# Patient Record
Sex: FEMALE | HISPANIC OR LATINO | Employment: FULL TIME | URBAN - METROPOLITAN AREA
[De-identification: names, ages, dates, MRNs, and addresses within clinical notes are randomized per-mention and may not be internally consistent; named-entity substitution may affect disease eponyms.]

---

## 2021-05-24 ENCOUNTER — OFFICE VISIT (OUTPATIENT)
Dept: FAMILY MEDICINE CLINIC | Facility: CLINIC | Age: 51
End: 2021-05-24

## 2021-05-24 VITALS
WEIGHT: 111.2 LBS | HEIGHT: 60 IN | TEMPERATURE: 98.7 F | BODY MASS INDEX: 21.83 KG/M2 | DIASTOLIC BLOOD PRESSURE: 90 MMHG | SYSTOLIC BLOOD PRESSURE: 151 MMHG | HEART RATE: 83 BPM | OXYGEN SATURATION: 99 %

## 2021-05-24 DIAGNOSIS — Z00.00 WELLNESS EXAMINATION: Primary | ICD-10-CM

## 2021-05-24 PROCEDURE — 99386 PREV VISIT NEW AGE 40-64: CPT | Performed by: FAMILY MEDICINE

## 2021-05-24 NOTE — PROGRESS NOTES
"Chief Complaint  Establish Care and Annual Exam    Subjective          Clarita Burton presents to [facility]  History of Present Illness  Mom and dad with HTN, not fasting got COVID vaccine,last Colonoscopy ever  , last pap 2019 and MY also, not fasting, no colonoscopy ever, no chest pain or shortness of breath, no abdominal pain, no dysuria  Objective   Vital Signs:   /90 (BP Location: Right arm, Patient Position: Sitting)   Pulse 83   Temp 98.7 °F (37.1 °C) (Temporal)   Ht 152.4 cm (60\")   Wt 50.4 kg (111 lb 3.2 oz)   SpO2 99%   BMI 21.72 kg/m²     Physical Exam  Vitals and nursing note reviewed.   Constitutional:       Appearance: She is well-developed.   HENT:      Head: Normocephalic and atraumatic.      Right Ear: External ear normal.      Left Ear: External ear normal.      Nose: Nose normal.   Eyes:      General: No scleral icterus.        Right eye: No discharge.         Left eye: No discharge.      Conjunctiva/sclera: Conjunctivae normal.      Pupils: Pupils are equal, round, and reactive to light.   Neck:      Thyroid: No thyromegaly.      Vascular: No JVD.      Trachea: No tracheal deviation.   Cardiovascular:      Rate and Rhythm: Normal rate and regular rhythm.      Heart sounds: Normal heart sounds. No murmur heard.   No friction rub. No gallop.    Pulmonary:      Effort: Pulmonary effort is normal. No respiratory distress.      Breath sounds: Normal breath sounds. No wheezing or rales.   Chest:      Chest wall: No tenderness.   Abdominal:      General: Bowel sounds are normal. There is no distension.      Palpations: Abdomen is soft. There is no mass.      Tenderness: There is no abdominal tenderness. There is no guarding or rebound.      Hernia: No hernia is present.   Musculoskeletal:         General: No tenderness. Normal range of motion.      Cervical back: Normal range of motion and neck supple.   Lymphadenopathy:      Cervical: No cervical adenopathy. "   Skin:     General: Skin is warm and dry.   Neurological:      Cranial Nerves: No cranial nerve deficit.      Sensory: No sensory deficit.      Motor: No abnormal muscle tone.      Coordination: Coordination normal.      Deep Tendon Reflexes: Reflexes normal.   Psychiatric:         Behavior: Behavior normal.         Thought Content: Thought content normal.         Judgment: Judgment normal.        Result Review :                 Assessment and Plan    Diagnoses and all orders for this visit:    1. Wellness examination (Primary)  -     Cologuard - Stool, Per Rectum; Future  -     Mammo Screening Bilateral With CAD; Future  -     CBC & Differential; Future  -     Comprehensive Metabolic Panel; Future  -     Lipid Panel; Future  -     TSH; Future  -     POC Urinalysis Dipstick, Automated; Future  -     Hepatitis C Antibody; Future        Follow Up   Return in about 4 weeks (around 6/21/2021).  Patient was given instructions and counseling regarding her condition or for health maintenance advice. Please see specific information pulled into the AVS if appropriate.     Monitor blood pressure, BP recheck 127/80

## 2021-06-14 DIAGNOSIS — Z00.00 WELLNESS EXAMINATION: ICD-10-CM

## 2021-06-15 LAB
ALBUMIN SERPL-MCNC: 4.6 G/DL (ref 3.5–5.2)
ALBUMIN/GLOB SERPL: 1.5 G/DL
ALP SERPL-CCNC: 59 U/L (ref 39–117)
ALT SERPL-CCNC: 11 U/L (ref 1–33)
AST SERPL-CCNC: 12 U/L (ref 1–32)
BASOPHILS # BLD AUTO: 0.05 10*3/MM3 (ref 0–0.2)
BASOPHILS NFR BLD AUTO: 0.7 % (ref 0–1.5)
BILIRUB SERPL-MCNC: 0.2 MG/DL (ref 0–1.2)
BUN SERPL-MCNC: 22 MG/DL (ref 6–20)
BUN/CREAT SERPL: 33.8 (ref 7–25)
CALCIUM SERPL-MCNC: 9.3 MG/DL (ref 8.6–10.5)
CHLORIDE SERPL-SCNC: 104 MMOL/L (ref 98–107)
CHOLEST SERPL-MCNC: 193 MG/DL (ref 0–200)
CO2 SERPL-SCNC: 25.1 MMOL/L (ref 22–29)
CREAT SERPL-MCNC: 0.65 MG/DL (ref 0.57–1)
EOSINOPHIL # BLD AUTO: 0.25 10*3/MM3 (ref 0–0.4)
EOSINOPHIL NFR BLD AUTO: 3.4 % (ref 0.3–6.2)
ERYTHROCYTE [DISTWIDTH] IN BLOOD BY AUTOMATED COUNT: 12.1 % (ref 12.3–15.4)
GLOBULIN SER CALC-MCNC: 3 GM/DL
GLUCOSE SERPL-MCNC: 87 MG/DL (ref 65–99)
HCT VFR BLD AUTO: 38.1 % (ref 34–46.6)
HCV AB S/CO SERPL IA: <0.1 S/CO RATIO (ref 0–0.9)
HDLC SERPL-MCNC: 76 MG/DL (ref 40–60)
HGB BLD-MCNC: 12.6 G/DL (ref 12–15.9)
IMM GRANULOCYTES # BLD AUTO: 0.02 10*3/MM3 (ref 0–0.05)
IMM GRANULOCYTES NFR BLD AUTO: 0.3 % (ref 0–0.5)
LDLC SERPL CALC-MCNC: 107 MG/DL (ref 0–100)
LYMPHOCYTES # BLD AUTO: 2.71 10*3/MM3 (ref 0.7–3.1)
LYMPHOCYTES NFR BLD AUTO: 36.9 % (ref 19.6–45.3)
MCH RBC QN AUTO: 32.8 PG (ref 26.6–33)
MCHC RBC AUTO-ENTMCNC: 33.1 G/DL (ref 31.5–35.7)
MCV RBC AUTO: 99.2 FL (ref 79–97)
MONOCYTES # BLD AUTO: 0.69 10*3/MM3 (ref 0.1–0.9)
MONOCYTES NFR BLD AUTO: 9.4 % (ref 5–12)
NEUTROPHILS # BLD AUTO: 3.62 10*3/MM3 (ref 1.7–7)
NEUTROPHILS NFR BLD AUTO: 49.3 % (ref 42.7–76)
NRBC BLD AUTO-RTO: 0 /100 WBC (ref 0–0.2)
PLATELET # BLD AUTO: 204 10*3/MM3 (ref 140–450)
POTASSIUM SERPL-SCNC: 4.1 MMOL/L (ref 3.5–5.2)
PROT SERPL-MCNC: 7.6 G/DL (ref 6–8.5)
RBC # BLD AUTO: 3.84 10*6/MM3 (ref 3.77–5.28)
SODIUM SERPL-SCNC: 139 MMOL/L (ref 136–145)
TRIGL SERPL-MCNC: 55 MG/DL (ref 0–150)
TSH SERPL DL<=0.005 MIU/L-ACNC: 3.11 UIU/ML (ref 0.27–4.2)
VLDLC SERPL CALC-MCNC: 10 MG/DL (ref 5–40)
WBC # BLD AUTO: 7.34 10*3/MM3 (ref 3.4–10.8)

## 2022-03-25 ENCOUNTER — TELEPHONE (OUTPATIENT)
Dept: FAMILY MEDICINE CLINIC | Facility: CLINIC | Age: 52
End: 2022-03-25

## 2022-03-25 DIAGNOSIS — Z01.419 WELL FEMALE EXAM WITH ROUTINE GYNECOLOGICAL EXAM: Primary | ICD-10-CM

## 2022-03-25 NOTE — TELEPHONE ENCOUNTER
Caller: Chace Scott     Relationship:  (EC)     Best call back number: [phone] (H)    What is the best time to reach you: ANYTIME    Who are you requesting to speak with (clinical staff, provider,  specific staff member): DR. OMID MOBLEY    Do you know the name of the person who called:      What was the call regarding:  PATIENT'S  CALLED TO REQUEST A PSYCH EVAL FOR PATIENT.    Do you require a callback: YES         THANKS

## 2022-03-25 NOTE — TELEPHONE ENCOUNTER
Patient  called on behalf of the patient and wants her to have a psychological test done.  Patient can be reached at [phone]

## 2022-04-05 ENCOUNTER — TRANSCRIBE ORDERS (OUTPATIENT)
Dept: ADMINISTRATIVE | Facility: HOSPITAL | Age: 52
End: 2022-04-05

## 2022-04-05 DIAGNOSIS — Z12.31 OTHER SCREENING MAMMOGRAM: Primary | ICD-10-CM

## 2022-04-29 ENCOUNTER — OFFICE VISIT (OUTPATIENT)
Dept: OBSTETRICS AND GYNECOLOGY | Age: 52
End: 2022-04-29

## 2022-04-29 VITALS
WEIGHT: 111.2 LBS | BODY MASS INDEX: 21.83 KG/M2 | DIASTOLIC BLOOD PRESSURE: 88 MMHG | HEIGHT: 60 IN | SYSTOLIC BLOOD PRESSURE: 134 MMHG

## 2022-04-29 DIAGNOSIS — Z01.419 ENCOUNTER FOR GYNECOLOGICAL EXAMINATION WITHOUT ABNORMAL FINDING: ICD-10-CM

## 2022-04-29 DIAGNOSIS — Z12.4 SCREENING FOR MALIGNANT NEOPLASM OF CERVIX: Primary | ICD-10-CM

## 2022-04-29 PROCEDURE — 99386 PREV VISIT NEW AGE 40-64: CPT | Performed by: NURSE PRACTITIONER

## 2022-04-29 NOTE — PROGRESS NOTES
[facility]   Obstetrics and Gynecology       2022    Patient: Tarah Funk          MR#:[id number]    History of Present Illness    Chief Complaint   Patient presents with   • [facility]     New gyn for annual exam. Last pap , results negative per pt, last mg , results negative per pt, no colonoscopy yet.        51 y.o. female  who presents for annual exam. She is sexually active with her . She is not preventing pregnancy. She is feeling well and has no complaints. MXR scheduled for July this year.     Studies reviewed:    Patient's last menstrual period was 2022 (exact date).  Obstetric History:  OB History        1    Para   1    Term   1            AB        Living   1       SAB        IAB        Ectopic        Molar        Multiple        Live Births   1               Menstrual History:     Patient's last menstrual period was 2022 (exact date).       Sexual History:       ________________________________________  Patient Active Problem List   Diagnosis   • Wellness examination     History reviewed. No pertinent past medical history.  Past Surgical History:   Procedure Laterality Date   • BREAST BIOPSY       Social History     Tobacco Use   Smoking Status Never Smoker   Smokeless Tobacco Never Used     History reviewed. No pertinent family history.  Prior to Admission medications    Not on File     ________________________________________    Current contraception: none  History of abnormal Pap smear: no  Family history of uterine or ovarian cancer: no  Family History of colon cancer/colon polyps: no  History of abnormal mammogram: yes - left breast biopsy in  reported benign  History of abnormal lipids: no    The following portions of the patient's history were reviewed and updated as appropriate: allergies, current medications, past family history, past medical history, past social history, past surgical history, and problem  "list.    Review of Systems   Constitutional: Negative for activity change, appetite change, chills, fatigue and fever.   Respiratory: Negative for cough and shortness of breath.    Cardiovascular: Negative for chest pain.   Gastrointestinal: Negative for constipation, diarrhea, nausea and vomiting.   Genitourinary: Negative for dysuria, flank pain, genital sores, hematuria, menstrual problem and vaginal bleeding.            Objective   Physical Exam  Constitutional:       Appearance: Normal appearance.   HENT:      Head: Normocephalic and atraumatic.      Nose: Nose normal.      Mouth/Throat:      Mouth: Mucous membranes are moist.   Pulmonary:      Effort: Pulmonary effort is normal.   Chest:   Breasts:      Right: Normal. No mass or nipple discharge.      Left: Normal. No mass or nipple discharge.       Abdominal:      General: Abdomen is flat.      Palpations: Abdomen is soft.   Genitourinary:     General: Normal vulva.      Exam position: Lithotomy position.      Labia:         Right: No rash, tenderness or lesion.         Left: No rash, tenderness or lesion.       Vagina: Normal. No signs of injury.      Cervix: Normal.      Uterus: Normal.       Adnexa: Right adnexa normal and left adnexa normal.      Rectum: Normal.   Musculoskeletal:         General: Normal range of motion.      Cervical back: Normal range of motion.   Skin:     General: Skin is warm and dry.   Neurological:      Mental Status: She is alert.   Psychiatric:         Mood and Affect: Mood normal.         Behavior: Behavior normal.         /88   Ht 152.4 cm (60\")   Wt 50.4 kg (111 lb 3.2 oz)   LMP 04/08/2022 (Exact Date)   Breastfeeding No   BMI 21.72 kg/m²    BP Readings from Last 3 Encounters:   04/29/22 134/88   05/24/21 151/90      Wt Readings from Last 3 Encounters:   04/29/22 50.4 kg (111 lb 3.2 oz)   05/24/21 50.4 kg (111 lb 3.2 oz)        BMI: Estimated body mass index is 21.72 kg/m² as calculated from the following:    Height " "as of this encounter: 152.4 cm (60\").    Weight as of this encounter: 50.4 kg (111 lb 3.2 oz).    Counseling:  --Nutrition: Stressed importance of moderation and caloric balance, stressed fresh fruit and vegetables  --Exercise: Stressed the importance of regular exercise. 3-5 times weekly   - Discussed screening mammogram recommendations.   --Discussed benefits of screening colonoscopy- age 45 unless FH  --Discussed pap smear screening recommendations             Assessment:  Diagnoses and all orders for this visit:    1. Screening for malignant neoplasm of cervix (Primary)  -     IGP, Apt HPV,rfx 16 / 18,45        Plan:  No follow-ups on file.    Kate Patricia, APRN  4/29/2022 08:57 EDT   "

## 2022-05-04 LAB
CYTOLOGIST CVX/VAG CYTO: NORMAL
CYTOLOGY CVX/VAG DOC CYTO: NORMAL
CYTOLOGY CVX/VAG DOC THIN PREP: NORMAL
DX ICD CODE: NORMAL
HIV 1 & 2 AB SER-IMP: NORMAL
HPV I/H RISK 4 DNA CVX QL PROBE+SIG AMP: NEGATIVE
OTHER STN SPEC: NORMAL
STAT OF ADQ CVX/VAG CYTO-IMP: NORMAL

## 2022-07-11 ENCOUNTER — HOSPITAL ENCOUNTER (OUTPATIENT)
Dept: MAMMOGRAPHY | Facility: HOSPITAL | Age: 52
End: 2022-07-11

## 2023-05-08 ENCOUNTER — OFFICE VISIT (OUTPATIENT)
Dept: OBSTETRICS AND GYNECOLOGY | Age: 53
End: 2023-05-08
Payer: COMMERCIAL

## 2023-05-08 ENCOUNTER — PROCEDURE VISIT (OUTPATIENT)
Dept: OBSTETRICS AND GYNECOLOGY | Age: 53
End: 2023-05-08
Payer: COMMERCIAL

## 2023-05-08 VITALS
HEIGHT: 60 IN | BODY MASS INDEX: 22.19 KG/M2 | SYSTOLIC BLOOD PRESSURE: 112 MMHG | DIASTOLIC BLOOD PRESSURE: 62 MMHG | WEIGHT: 113 LBS

## 2023-05-08 DIAGNOSIS — N95.1 MENOPAUSAL SYMPTOMS: ICD-10-CM

## 2023-05-08 DIAGNOSIS — Z01.419 WELL WOMAN EXAM WITH ROUTINE GYNECOLOGICAL EXAM: Primary | ICD-10-CM

## 2023-05-08 DIAGNOSIS — Z12.31 VISIT FOR SCREENING MAMMOGRAM: Primary | ICD-10-CM

## 2023-05-08 PROCEDURE — 99396 PREV VISIT EST AGE 40-64: CPT | Performed by: PHYSICIAN ASSISTANT

## 2023-05-08 RX ORDER — LEVOTHYROXINE SODIUM 0.03 MG/1
25 TABLET ORAL
COMMUNITY

## 2023-05-08 RX ORDER — ESTRADIOL/NORETHINDRONE ACETATE TRANSDERMAL SYSTEM .05; .14 MG/D; MG/D
1 PATCH, EXTENDED RELEASE TRANSDERMAL 2 TIMES WEEKLY
Qty: 8 EACH | Refills: 3 | Status: SHIPPED | OUTPATIENT
Start: 2023-05-08

## 2023-05-08 NOTE — PROGRESS NOTES
Subjective     Chief Complaint   Patient presents with   • Gynecologic Exam     annual exam last pap 2022 neg/neg, m/g today       History of Present Illness    Tarah Funk is a 52 y.o.  who presents for annual exam.    She is doing well    Has not had a period since October  Notes hot flashes at night and cold spells  Is open to starting HT for her sx's  No significant med hx and non smoker    CSC done in , due in 10 years  Mammogram done today  Pap done last year and was wnl, no h/o abnormal pap    Her menses are rare, lasting 0-3 days, dysmenorrhea none   Obstetric History:  OB History        1    Para   1    Term   1            AB        Living   1       SAB        IAB        Ectopic        Molar        Multiple        Live Births   1               Menstrual History:     Patient's last menstrual period was 10/01/2022 (approximate).         Current contraception: menopausal  History of abnormal Pap smear: no  Received Gardasil immunization: no  Perform regular self breast exam: yes - occl  Family history of uterine or ovarian cancer: no  Family History of colon cancer: no  Family history of breast cancer: no    Mammogram: done today.  Colonoscopy: up to date.  DEXA: not indicated.    Exercise: moderately active  Calcium/Vitamin D: adequate intake    The following portions of the patient's history were reviewed and updated as appropriate: allergies, current medications, past family history, past medical history, past social history, past surgical history and problem list.    Review of Systems   Constitutional:        Hot flashes and night sweats     All other systems reviewed and are negative.      Review of Systems   Constitutional: Negative for fatigue.   Respiratory: Negative for shortness of breath.    Gastrointestinal: Negative for abdominal pain.   Genitourinary: Negative for dysuria.   Neurological: Negative for headaches.   Psychiatric/Behavioral: Negative for  "dysphoric mood.         Objective   Physical Exam    /62   Ht 152.4 cm (60\")   Wt 51.3 kg (113 lb)   LMP 10/01/2022 (Approximate)   BMI 22.07 kg/m²   General:   alert, comfortable and no distress   Heart: regular rate and rhythm   Lungs: clear to auscultation bilaterally   Breast: normal appearance, no masses or tenderness, Inspection negative, No nipple retraction or dimpling, No nipple discharge or bleeding, No axillary or supraclavicular adenopathy, Normal to palpation without dominant masses   Neck: no adenopathy and no carotid bruit   Abdomen: normal findings: soft, non-tender   CVA: Not performed today   Pelvis: External genitalia: normal general appearance  Vaginal: normal mucosa without prolapse or lesions  Cervix: normal appearance  Adnexa: normal bimanual exam  Uterus: normal single, nontender   Extremities: Not performed today   Neurologic: negative   Psychiatric: Normal affect, judgement, and mood     Assessment & Plan   Diagnoses and all orders for this visit:    1. Well woman exam with routine gynecological exam (Primary)    2. Menopausal symptoms    Other orders  -     estradiol-norethindrone (CombiPatch) 0.05-0.14 MG/DAY patch; Place 1 patch on the skin as directed by provider 2 (Two) Times a Week.  Dispense: 8 each; Refill: 3        All questions answered.  Breast self exam technique reviewed and patient encouraged to perform self-exam monthly.  Discussed healthy lifestyle modifications.  Recommended 30 minutes of aerobic exercise five times per week.  Discussed calcium needs to prevent osteoporosis.    Pap utd  Start HT, r/b/a   Add calcium to protect her bones                 "

## 2023-05-15 DIAGNOSIS — R92.8 ABNORMAL MAMMOGRAM: Primary | ICD-10-CM

## 2024-05-20 ENCOUNTER — HOSPITAL ENCOUNTER (OUTPATIENT)
Facility: HOSPITAL | Age: 54
Discharge: HOME | End: 2024-05-20
Admitting: PHYSICIAN ASSISTANT
Payer: COMMERCIAL

## 2024-05-20 ENCOUNTER — OFFICE VISIT (OUTPATIENT)
Dept: OBSTETRICS AND GYNECOLOGY | Age: 54
End: 2024-05-20
Payer: COMMERCIAL

## 2024-05-20 VITALS
DIASTOLIC BLOOD PRESSURE: 68 MMHG | HEIGHT: 60 IN | SYSTOLIC BLOOD PRESSURE: 112 MMHG | WEIGHT: 115 LBS | BODY MASS INDEX: 22.58 KG/M2

## 2024-05-20 DIAGNOSIS — Z12.31 VISIT FOR SCREENING MAMMOGRAM: ICD-10-CM

## 2024-05-20 DIAGNOSIS — N95.1 MENOPAUSAL SYMPTOMS: ICD-10-CM

## 2024-05-20 DIAGNOSIS — Z12.31 VISIT FOR SCREENING MAMMOGRAM: Primary | ICD-10-CM

## 2024-05-20 DIAGNOSIS — Z01.419 WELL WOMAN EXAM WITH ROUTINE GYNECOLOGICAL EXAM: Primary | ICD-10-CM

## 2024-05-20 PROCEDURE — 99396 PREV VISIT EST AGE 40-64: CPT | Performed by: PHYSICIAN ASSISTANT

## 2024-05-20 PROCEDURE — 77063 BREAST TOMOSYNTHESIS BI: CPT

## 2024-05-20 PROCEDURE — 77067 SCR MAMMO BI INCL CAD: CPT

## 2024-05-20 NOTE — PROGRESS NOTES
"Subjective     Chief Complaint   Patient presents with    Annual Exam     Annual exam last pap 2022 neg/neg, m/g done today, cologuard , no complaints        History of Present Illness    Tarah BEST Abimael Funk is a 53 y.o.  who presents for annual exam.    Last year I prescribed hormones for her but she did not start them  Declines them, is doing well and has no MP sxs    Pap us utd  Mammogram done today  PCP ordered cologuard  Labs are also utd and done through pcp     was utilized today, [place], [id number]  Obstetric History:  OB History          1    Para   1    Term   1            AB        Living   1         SAB        IAB        Ectopic        Molar        Multiple        Live Births   1               Menstrual History:     No LMP recorded. Patient is perimenopausal.         Current contraception: post menopausal status  History of abnormal Pap smear: no  Received Gardasil immunization: no  Perform regular self breast exam: yes - occl  Family history of uterine or ovarian cancer: no  Family History of colon cancer: no  Family history of breast cancer: no    Mammogram: up to date.done today  Colonoscopy: up to date.  DEXA: not indicated.    Exercise: moderately active  Calcium/Vitamin D: adequate intake    The following portions of the patient's history were reviewed and updated as appropriate: allergies, current medications, past family history, past medical history, past social history, past surgical history, and problem list.    Review of Systems   All other systems reviewed and are negative.      Review of Systems   Constitutional: Negative for fatigue.   Respiratory: Negative for shortness of breath.    Gastrointestinal: Negative for abdominal pain.   Genitourinary: Negative for dysuria.   Neurological: Negative for headaches.   Psychiatric/Behavioral: Negative for dysphoric mood.         Objective   Physical Exam    /68   Ht 152.4 cm (60\")   Wt 52.2 kg (115 " lb)   BMI 22.46 kg/m²   General:   alert, comfortable, and no distress   Heart: regular rate and rhythm   Lungs: clear to auscultation bilaterally   Breast: normal appearance, no masses or tenderness, Inspection negative, No nipple retraction or dimpling, No nipple discharge or bleeding, No axillary or supraclavicular adenopathy, Normal to palpation without dominant masses   Neck: no adenopathy and no carotid bruit   Abdomen: Not performed today   CVA: Not performed today   Pelvis: External genitalia: normal general appearance  Vaginal: normal mucosa without prolapse or lesions  Cervix: normal appearance  Adnexa: normal bimanual exam  Uterus: normal single, nontender   Extremities: Not performed today   Neurologic: negative   Psychiatric: Normal affect, judgement, and mood     Assessment & Plan   Diagnoses and all orders for this visit:    1. Well woman exam with routine gynecological exam (Primary)    2. Menopausal symptoms        All questions answered.  Breast self exam technique reviewed and patient encouraged to perform self-exam monthly.  Discussed healthy lifestyle modifications.  Recommended 30 minutes of aerobic exercise five times per week.  Discussed calcium needs to prevent osteoporosis.    Pap utd  Mammogram done today  Will need cologuard again

## 2025-06-02 ENCOUNTER — HOSPITAL ENCOUNTER (OUTPATIENT)
Facility: HOSPITAL | Age: 55
Discharge: HOME | End: 2025-06-02
Admitting: PHYSICIAN ASSISTANT
Payer: COMMERCIAL

## 2025-06-02 ENCOUNTER — OFFICE VISIT (OUTPATIENT)
Dept: OBSTETRICS AND GYNECOLOGY | Age: 55
End: 2025-06-02
Payer: COMMERCIAL

## 2025-06-02 VITALS
SYSTOLIC BLOOD PRESSURE: 128 MMHG | HEIGHT: 60 IN | DIASTOLIC BLOOD PRESSURE: 84 MMHG | WEIGHT: 113 LBS | BODY MASS INDEX: 22.19 KG/M2

## 2025-06-02 DIAGNOSIS — Z12.31 VISIT FOR SCREENING MAMMOGRAM: ICD-10-CM

## 2025-06-02 DIAGNOSIS — Z01.419 WELL WOMAN EXAM WITH ROUTINE GYNECOLOGICAL EXAM: Primary | ICD-10-CM

## 2025-06-02 DIAGNOSIS — Z12.4 ENCOUNTER FOR PAPANICOLAOU SMEAR FOR CERVICAL CANCER SCREENING: ICD-10-CM

## 2025-06-02 DIAGNOSIS — Z11.51 SCREENING FOR HPV (HUMAN PAPILLOMAVIRUS): ICD-10-CM

## 2025-06-02 PROCEDURE — 77067 SCR MAMMO BI INCL CAD: CPT

## 2025-06-02 PROCEDURE — 77063 BREAST TOMOSYNTHESIS BI: CPT

## 2025-06-02 RX ORDER — LOSARTAN POTASSIUM 25 MG/1
TABLET ORAL
COMMUNITY
Start: 2025-04-07

## 2025-06-02 NOTE — PROGRESS NOTES
"Subjective     Chief Complaint   Patient presents with    Annual Exam     Annual exam :   Last pap 2022 neg/neg  M/g done today  Cologuard  neg       History of Present Illness    Tarah Funk is a 54 y.o.  who presents for annual exam.    She is doing well  Had mammogram today  Due for pap  No c/o    Denies VB  Declines hormones    Sees pcp routinely for blood work and to manage her meds    She is Faroese speaking and an  was utilized for the exam  Obstetric History:  OB History          1    Para   1    Term   1            AB        Living   1         SAB        IAB        Ectopic        Molar        Multiple        Live Births   1               Menstrual History:     Patient's last menstrual period was 10/01/2022 (approximate).         Current contraception: post menopausal status  History of abnormal Pap smear: no  Received Gardasil immunization: no  Perform regular self breast exam: yes - mthly  Family history of uterine or ovarian cancer: no  Family History of colon cancer: no  Family history of breast cancer: no    Mammogram: done today.  Colonoscopy: up to date.  DEXA: not indicated.    Exercise: moderately active  Calcium/Vitamin D: adequate intake    The following portions of the patient's history were reviewed and updated as appropriate: allergies, current medications, past family history, past medical history, past social history, past surgical history, and problem list.    Review of Systems   All other systems reviewed and are negative.      Review of Systems   Constitutional: Negative for fatigue.   Respiratory: Negative for shortness of breath.    Gastrointestinal: Negative for abdominal pain.   Genitourinary: Negative for dysuria.   Neurological: Negative for headaches.   Psychiatric/Behavioral: Negative for dysphoric mood.         Objective   Physical Exam    /84   Ht 152.4 cm (60\")   Wt 51.3 kg (113 lb)   LMP 10/01/2022 (Approximate)   " BMI 22.07 kg/m²   General:   alert, comfortable, and no distress   Heart: regular rate and rhythm   Lungs: clear to auscultation bilaterally   Breast: normal appearance, no masses or tenderness, Inspection negative, No nipple retraction or dimpling, No nipple discharge or bleeding, No axillary or supraclavicular adenopathy, Normal to palpation without dominant masses   Neck: no adenopathy and no carotid bruit   Abdomen: Not performed today   CVA: Not performed today   Pelvis: External genitalia: normal general appearance  Vaginal: normal mucosa without prolapse or lesions  Cervix: normal appearance and thin prep PAP obtained  Adnexa: normal bimanual exam  Uterus: normal single, nontender   Extremities: Not performed today   Neurologic: negative   Psychiatric: Normal affect, judgement, and mood     Assessment & Plan   Diagnoses and all orders for this visit:    1. Well woman exam with routine gynecological exam (Primary)    2. Encounter for Papanicolaou smear for cervical cancer screening  -     IGP, Aptima HPV, Rfx 16 / 18,45    3. Screening for HPV (human papillomavirus)  -     IGP, Aptima HPV, Rfx 16 / 18,45        All questions answered.  Breast self exam technique reviewed and patient encouraged to perform self-exam monthly.  Discussed healthy lifestyle modifications.  Recommended 30 minutes of aerobic exercise five times per week.  Discussed calcium needs to prevent osteoporosis.    Pap done  Mammogram done  Call for any issues, f/u in 1 year

## 2025-06-04 LAB
CYTOLOGIST CVX/VAG CYTO: NORMAL
CYTOLOGY CVX/VAG DOC CYTO: NORMAL
CYTOLOGY CVX/VAG DOC THIN PREP: NORMAL
DX ICD CODE: NORMAL
HPV GENOTYPE REFLEX: NORMAL
HPV I/H RISK 4 DNA CVX QL PROBE+SIG AMP: NEGATIVE
OTHER STN SPEC: NORMAL
SERVICE CMNT-IMP: NORMAL
STAT OF ADQ CVX/VAG CYTO-IMP: NORMAL